# Patient Record
Sex: FEMALE | Race: WHITE
[De-identification: names, ages, dates, MRNs, and addresses within clinical notes are randomized per-mention and may not be internally consistent; named-entity substitution may affect disease eponyms.]

---

## 2020-10-06 ENCOUNTER — HOSPITAL ENCOUNTER (OUTPATIENT)
Dept: HOSPITAL 88 - MRI | Age: 70
End: 2020-10-06
Attending: INTERNAL MEDICINE
Payer: MEDICARE

## 2020-10-06 DIAGNOSIS — M43.16: Primary | ICD-10-CM

## 2020-10-06 PROCEDURE — 72148 MRI LUMBAR SPINE W/O DYE: CPT

## 2020-10-07 NOTE — DIAGNOSTIC IMAGING REPORT
History: Spondylolisthesis, low back pain

Comparison studies: None



Technique: 

Sagittal axial and coronal T2, sagittal T1 and IR, axial  spin density oblique.

Intravenous contrast: None



Findings:



Number of lumbar vertebral bodies: 5.



Alignment: Hyperlordotic lumbar curvature as well as small levo lumbar

curvature centered at L4-L5. Grade 1 anterolisthesis of L5 on S1 by proximally

8 mm secondary to chronic defects at the bilateral L5 pars interarticularis.



Soft tissues: No T2 hyperintense inflammatory changes.



Paraspinal muscles: Symmetric fatty-replaced atrophic changes, worse/moderate

from L4 through the lumbosacral junction.



Lower thoracic cord: Normal in signal and morphology. The tip of the conus is

at L2.



Cauda equina: No masses.  No arachnoiditis.



Vertebrae: 

Nonaggressive-appearing T2/STIR hyperintense lesions present in the T9 and T12

vertebral bodies which measure approximately 2.0 cm and 1.8 cm respectively.

Additional nonaggressive-appearing 1.8 cm lesion centered in the right L3

pedicle.



No compression fracture or infection.



Degenerative changes:



L1-L2:

Mildly degenerated disc. Asymmetric right disc bulge along the concavity lumbar

curvature results in mild right foraminal narrowing. Patent canal and left

foramen.



L2-L3: 

Mildly degenerated disc. Minimal retrolisthesis of L2 on L3 with associated

uncovered disc/disc bulge without significant canal or foraminal stenosis.



L3-L4:

Mildly degenerated disc. Asymmetric left disc bulge and mild facet arthrosis

result in mild left frontal stenosis. Patent canal and right foramen.



L4-L5:

Moderately degenerated disc with loss of disc height and degenerative endplate

changes without endplate edema on the right along the concavity lumbar

curvature. Disc osteophyte complex and facet arthrosis result in moderate

bilateral foraminal stenosis. No significant canal stenosis.



L5-S1:

Grade 1 L5 isthmic spondylolisthesis with associated uncovered disc/disc bulge

and advanced facet arthrosis result in severe bilateral foraminal stenosis with

impingement on the bilateral S1 nerve roots. No canal stenosis.



Additional findings:

Multiple indeterminate small T2 hyperintense lesions in the partially imaged

right lobe of the liver. Cannot further evaluate on this exam.



IMPRESSION: 



1.  Grade 1 isthmic spondylolisthesis at L5-S1 with associate severe bilateral

foraminal stenosis which result in bilateral L5 nerve root impingement.

2.  Moderate bilateral foraminal stenosis and moderate disc degeneration along

the concavity of mild lumbar levocurvature at L4-5.

3.  Additional degenerative changes as described. No canal stenosis

4.  Incidental vertebral body lesions (T9, T12 and at L3). Though benign lesion

such as atypical/lipid-poor hemangiomas could have this appearance, bone scan

to further evaluate or follow-up lumbar spine to document stability are

recommended.

5.  Multiple small indeterminate liver lesions. Abdomen CT may further

evaluate. Thoracic bone lesions may also be further characterize by CT at that

time.



Signed by: Dr. Huang Rinaldi M.D. on 10/7/2020 1:07 PM